# Patient Record
Sex: MALE | Race: WHITE | NOT HISPANIC OR LATINO | URBAN - METROPOLITAN AREA
[De-identification: names, ages, dates, MRNs, and addresses within clinical notes are randomized per-mention and may not be internally consistent; named-entity substitution may affect disease eponyms.]

---

## 2017-11-08 ENCOUNTER — APPOINTMENT (RX ONLY)
Dept: URBAN - METROPOLITAN AREA CLINIC 86 | Facility: CLINIC | Age: 72
Setting detail: DERMATOLOGY
End: 2017-11-08

## 2017-11-08 DIAGNOSIS — L57.0 ACTINIC KERATOSIS: ICD-10-CM

## 2017-11-08 DIAGNOSIS — L73.8 OTHER SPECIFIED FOLLICULAR DISORDERS: ICD-10-CM

## 2017-11-08 DIAGNOSIS — L82.1 OTHER SEBORRHEIC KERATOSIS: ICD-10-CM

## 2017-11-08 DIAGNOSIS — L91.8 OTHER HYPERTROPHIC DISORDERS OF THE SKIN: ICD-10-CM

## 2017-11-08 DIAGNOSIS — L57.8 OTHER SKIN CHANGES DUE TO CHRONIC EXPOSURE TO NONIONIZING RADIATION: ICD-10-CM

## 2017-11-08 PROCEDURE — 99213 OFFICE O/P EST LOW 20 MIN: CPT

## 2017-11-08 PROCEDURE — ? ADDITIONAL NOTES

## 2017-11-08 PROCEDURE — ? COUNSELING

## 2017-11-08 ASSESSMENT — LOCATION SIMPLE DESCRIPTION DERM
LOCATION SIMPLE: LEFT CHEEK
LOCATION SIMPLE: RIGHT MIDDLE FINGER
LOCATION SIMPLE: LEFT ANTERIOR NECK
LOCATION SIMPLE: RIGHT FOREHEAD
LOCATION SIMPLE: RIGHT CHEEK
LOCATION SIMPLE: RIGHT UPPER BACK
LOCATION SIMPLE: RIGHT HAND
LOCATION SIMPLE: LEFT EYEBROW

## 2017-11-08 ASSESSMENT — LOCATION DETAILED DESCRIPTION DERM
LOCATION DETAILED: RIGHT SUPERIOR FOREHEAD
LOCATION DETAILED: RIGHT SUPERIOR LATERAL MALAR CHEEK
LOCATION DETAILED: RIGHT SUPERIOR CENTRAL MALAR CHEEK
LOCATION DETAILED: RIGHT MIDDLE PROXIMAL INTERPHALANGEAL JOINT
LOCATION DETAILED: RIGHT SUPERIOR LATERAL UPPER BACK
LOCATION DETAILED: LEFT CLAVICULAR NECK
LOCATION DETAILED: LEFT LATERAL EYEBROW
LOCATION DETAILED: LEFT CENTRAL MALAR CHEEK
LOCATION DETAILED: LEFT SUPERIOR LATERAL BUCCAL CHEEK
LOCATION DETAILED: RIGHT ULNAR DORSAL HAND

## 2017-11-08 ASSESSMENT — LOCATION ZONE DERM
LOCATION ZONE: NECK
LOCATION ZONE: FINGER
LOCATION ZONE: HAND
LOCATION ZONE: FACE
LOCATION ZONE: TRUNK

## 2017-11-08 ASSESSMENT — TOTAL NUMBER OF LESIONS: # OF LESIONS?: 5

## 2017-11-08 NOTE — PROCEDURE: ADDITIONAL NOTES
Additional Notes: Dessication site around left eye, right eye, liquid nitrogen treated 2 sites on back

## 2018-10-18 ENCOUNTER — APPOINTMENT (RX ONLY)
Dept: URBAN - METROPOLITAN AREA CLINIC 86 | Facility: CLINIC | Age: 73
Setting detail: DERMATOLOGY
End: 2018-10-18

## 2018-10-18 DIAGNOSIS — H61.03 CHONDRITIS OF EXTERNAL EAR: ICD-10-CM

## 2018-10-18 DIAGNOSIS — D485 NEOPLASM OF UNCERTAIN BEHAVIOR OF SKIN: ICD-10-CM

## 2018-10-18 DIAGNOSIS — L57.8 OTHER SKIN CHANGES DUE TO CHRONIC EXPOSURE TO NONIONIZING RADIATION: ICD-10-CM | Status: STABLE

## 2018-10-18 DIAGNOSIS — L82.1 OTHER SEBORRHEIC KERATOSIS: ICD-10-CM

## 2018-10-18 DIAGNOSIS — L91.8 OTHER HYPERTROPHIC DISORDERS OF THE SKIN: ICD-10-CM

## 2018-10-18 PROBLEM — H61.032 CHONDRITIS OF LEFT EXTERNAL EAR: Status: ACTIVE | Noted: 2018-10-18

## 2018-10-18 PROBLEM — D48.5 NEOPLASM OF UNCERTAIN BEHAVIOR OF SKIN: Status: ACTIVE | Noted: 2018-10-18

## 2018-10-18 PROCEDURE — ? BENIGN DESTRUCTION COSMETIC

## 2018-10-18 PROCEDURE — 11100: CPT

## 2018-10-18 PROCEDURE — ? BIOPSY BY SHAVE METHOD

## 2018-10-18 PROCEDURE — ? COUNSELING

## 2018-10-18 PROCEDURE — 99213 OFFICE O/P EST LOW 20 MIN: CPT | Mod: 25

## 2018-10-18 ASSESSMENT — LOCATION SIMPLE DESCRIPTION DERM
LOCATION SIMPLE: RIGHT EYEBROW
LOCATION SIMPLE: LEFT EAR
LOCATION SIMPLE: LEFT EYEBROW
LOCATION SIMPLE: LEFT CHEEK
LOCATION SIMPLE: CHEST
LOCATION SIMPLE: RIGHT UPPER BACK
LOCATION SIMPLE: LEFT TEMPLE
LOCATION SIMPLE: LEFT UPPER ARM

## 2018-10-18 ASSESSMENT — LOCATION DETAILED DESCRIPTION DERM
LOCATION DETAILED: RIGHT SUPERIOR MEDIAL UPPER BACK
LOCATION DETAILED: RIGHT MEDIAL UPPER BACK
LOCATION DETAILED: LEFT CENTRAL TEMPLE
LOCATION DETAILED: STERNUM
LOCATION DETAILED: LEFT LATERAL EYEBROW
LOCATION DETAILED: LEFT CENTRAL MALAR CHEEK
LOCATION DETAILED: RIGHT LATERAL EYEBROW
LOCATION DETAILED: LEFT ANTIHELIX
LOCATION DETAILED: LEFT DISTAL POSTERIOR UPPER ARM

## 2018-10-18 ASSESSMENT — LOCATION ZONE DERM
LOCATION ZONE: FACE
LOCATION ZONE: EAR
LOCATION ZONE: ARM
LOCATION ZONE: TRUNK

## 2018-10-18 ASSESSMENT — PAIN INTENSITY VAS: HOW INTENSE IS YOUR PAIN 0 BEING NO PAIN, 10 BEING THE MOST SEVERE PAIN POSSIBLE?: NO PAIN

## 2018-10-18 NOTE — PROCEDURE: BENIGN DESTRUCTION COSMETIC
Detail Level: Simple
Anesthesia Volume In Cc: 0.5
Post-Care Instructions: I reviewed with the patient in detail post-care instructions. Patient is to wear sunprotection, and avoid picking at any of the treated lesions. Pt may apply Vaseline to crusted or scabbing areas.
Consent: The patient's consent was obtained including but not limited to risks of crusting, scabbing, blistering, scarring, darker or lighter pigmentary change, recurrence, incomplete removal and infection.

## 2018-10-18 NOTE — PROCEDURE: BIOPSY BY SHAVE METHOD
Depth Of Biopsy: dermis
Body Location Override (Optional - Billing Will Still Be Based On Selected Body Map Location If Applicable): left temple
Bill 92221 For Specimen Handling/Conveyance To Laboratory?: no
Consent: Verbal consent was obtained and risks were reviewed including but not limited to scarring, infection, bleeding, scabbing, incomplete removal, nerve damage and allergy to anesthesia.
Cryotherapy Text: The wound bed was treated with cryotherapy after the biopsy was performed.
Silver Nitrate Text: The wound bed was treated with silver nitrate after the biopsy was performed.
Anesthesia Volume In Cc (Will Not Render If 0): 0.5
Biopsy Method: 15 blade
Post-Care Instructions: I reviewed with the patient in detail post-care instructions. Patient is to keep the biopsy site dry overnight, and then apply bacitracin twice daily until healed. Patient may apply hydrogen peroxide soaks to remove any crusting.
Electrodesiccation Text: The wound bed was treated with electrodesiccation after the biopsy was performed.
Anesthesia Type: 2% lidocaine without epinephrine
Size Of Lesion In Cm: 0.6
Billing Type: Third-Party Bill
Additional Anesthesia Volume In Cc (Will Not Render If 0): 0
Curettage Text: The wound bed was treated with curettage after the biopsy was performed.
Biopsy Type: H and E
Render Post-Care Instructions In Note?: yes
Detail Level: Simple
Notification Instructions: Patient will be notified of biopsy results. However, patient instructed to call the office if not contacted within 2 weeks.
Lab: [id number]
Hemostasis: Drysol
Dressing: bandage
Lab Facility: [id number]
Type Of Destruction Used: Electrodesiccation
Electrodesiccation And Curettage Text: The wound bed was treated with electrodesiccation and curettage after the biopsy was performed.
Wound Care: Vaseline

## 2019-05-24 ENCOUNTER — APPOINTMENT (RX ONLY)
Dept: URBAN - METROPOLITAN AREA CLINIC 86 | Facility: CLINIC | Age: 74
Setting detail: DERMATOLOGY
End: 2019-05-24

## 2019-05-24 DIAGNOSIS — L57.8 OTHER SKIN CHANGES DUE TO CHRONIC EXPOSURE TO NONIONIZING RADIATION: ICD-10-CM | Status: STABLE

## 2019-05-24 DIAGNOSIS — D17 BENIGN LIPOMATOUS NEOPLASM: ICD-10-CM

## 2019-05-24 DIAGNOSIS — L82.1 OTHER SEBORRHEIC KERATOSIS: ICD-10-CM

## 2019-05-24 PROBLEM — D17.24 BENIGN LIPOMATOUS NEOPLASM OF SKIN AND SUBCUTANEOUS TISSUE OF LEFT LEG: Status: ACTIVE | Noted: 2019-05-24

## 2019-05-24 PROBLEM — D17.22 BENIGN LIPOMATOUS NEOPLASM OF SKIN AND SUBCUTANEOUS TISSUE OF LEFT ARM: Status: ACTIVE | Noted: 2019-05-24

## 2019-05-24 PROBLEM — D17.21 BENIGN LIPOMATOUS NEOPLASM OF SKIN AND SUBCUTANEOUS TISSUE OF RIGHT ARM: Status: ACTIVE | Noted: 2019-05-24

## 2019-05-24 PROCEDURE — ? LIQUID NITROGEN (COSMETIC)

## 2019-05-24 PROCEDURE — 99213 OFFICE O/P EST LOW 20 MIN: CPT

## 2019-05-24 PROCEDURE — ? COUNSELING

## 2019-05-24 ASSESSMENT — LOCATION DETAILED DESCRIPTION DERM
LOCATION DETAILED: LEFT ANTERIOR PROXIMAL THIGH
LOCATION DETAILED: RIGHT VENTRAL DISTAL FOREARM
LOCATION DETAILED: LEFT CLAVICULAR SKIN
LOCATION DETAILED: RIGHT MID TEMPLE
LOCATION DETAILED: LEFT VENTRAL PROXIMAL FOREARM

## 2019-05-24 ASSESSMENT — LOCATION ZONE DERM
LOCATION ZONE: LEG
LOCATION ZONE: FACE
LOCATION ZONE: ARM
LOCATION ZONE: TRUNK

## 2019-05-24 ASSESSMENT — PAIN INTENSITY VAS: HOW INTENSE IS YOUR PAIN 0 BEING NO PAIN, 10 BEING THE MOST SEVERE PAIN POSSIBLE?: NO PAIN

## 2019-05-24 ASSESSMENT — LOCATION SIMPLE DESCRIPTION DERM
LOCATION SIMPLE: LEFT CLAVICULAR SKIN
LOCATION SIMPLE: RIGHT FOREARM
LOCATION SIMPLE: RIGHT TEMPLE
LOCATION SIMPLE: LEFT THIGH
LOCATION SIMPLE: LEFT FOREARM

## 2019-12-30 ENCOUNTER — APPOINTMENT (RX ONLY)
Dept: URBAN - METROPOLITAN AREA CLINIC 86 | Facility: CLINIC | Age: 74
Setting detail: DERMATOLOGY
End: 2019-12-30

## 2019-12-30 DIAGNOSIS — L30.0 NUMMULAR DERMATITIS: ICD-10-CM | Status: STABLE

## 2019-12-30 PROCEDURE — ? TREATMENT REGIMEN

## 2019-12-30 PROCEDURE — ? PRESCRIPTION

## 2019-12-30 PROCEDURE — ? COUNSELING

## 2019-12-30 PROCEDURE — ? PRESCRIPTION SAMPLES PROVIDED

## 2019-12-30 PROCEDURE — 99213 OFFICE O/P EST LOW 20 MIN: CPT

## 2019-12-30 RX ORDER — TRIAMCINOLONE ACETONIDE 1 MG/G
BID CREAM TOPICAL BID
Qty: 1 | Refills: 1 | Status: ERX | COMMUNITY
Start: 2019-12-30

## 2019-12-30 RX ADMIN — TRIAMCINOLONE ACETONIDE BID: 1 CREAM TOPICAL at 00:00

## 2019-12-30 ASSESSMENT — LOCATION ZONE DERM: LOCATION ZONE: LEG

## 2019-12-30 ASSESSMENT — LOCATION DETAILED DESCRIPTION DERM: LOCATION DETAILED: RIGHT POSTERIOR ANKLE

## 2019-12-30 ASSESSMENT — LOCATION SIMPLE DESCRIPTION DERM: LOCATION SIMPLE: RIGHT ANKLE

## 2019-12-30 ASSESSMENT — BSA RASH: BSA RASH: 1

## 2019-12-30 ASSESSMENT — SEVERITY ASSESSMENT: SEVERITY: MILD TO MODERATE

## 2019-12-30 NOTE — PROCEDURE: MIPS QUALITY
Quality 111:Pneumonia Vaccination Status For Older Adults: Pneumococcal Vaccination Previously Received
Detail Level: Detailed
Quality 131: Pain Assessment And Follow-Up: No documentation of pain assessment, reason not given
Quality 130: Documentation Of Current Medications In The Medical Record: Current Medications Documented
Quality 402: Tobacco Use And Help With Quitting Among Adolescents: Patient screened for tobacco and is an ex-smoker
Quality 110: Preventive Care And Screening: Influenza Immunization: Influenza Immunization Administered during Influenza season

## 2020-07-31 ENCOUNTER — APPOINTMENT (RX ONLY)
Dept: URBAN - METROPOLITAN AREA CLINIC 86 | Facility: CLINIC | Age: 75
Setting detail: DERMATOLOGY
End: 2020-07-31

## 2020-07-31 VITALS — TEMPERATURE: 97.7 F

## 2020-07-31 DIAGNOSIS — L23.9 ALLERGIC CONTACT DERMATITIS, UNSPECIFIED CAUSE: ICD-10-CM

## 2020-07-31 DIAGNOSIS — L57.8 OTHER SKIN CHANGES DUE TO CHRONIC EXPOSURE TO NONIONIZING RADIATION: ICD-10-CM

## 2020-07-31 PROBLEM — L30.9 DERMATITIS, UNSPECIFIED: Status: ACTIVE | Noted: 2020-07-31

## 2020-07-31 PROCEDURE — ? TREATMENT REGIMEN

## 2020-07-31 PROCEDURE — ? COUNSELING

## 2020-07-31 PROCEDURE — 99213 OFFICE O/P EST LOW 20 MIN: CPT

## 2020-07-31 ASSESSMENT — LOCATION SIMPLE DESCRIPTION DERM: LOCATION SIMPLE: CHEST

## 2020-07-31 ASSESSMENT — BSA RASH: BSA RASH: 2

## 2020-07-31 ASSESSMENT — PAIN INTENSITY VAS: HOW INTENSE IS YOUR PAIN 0 BEING NO PAIN, 10 BEING THE MOST SEVERE PAIN POSSIBLE?: NO PAIN

## 2020-07-31 ASSESSMENT — LOCATION DETAILED DESCRIPTION DERM: LOCATION DETAILED: STERNUM

## 2020-07-31 ASSESSMENT — LOCATION ZONE DERM: LOCATION ZONE: TRUNK

## 2020-07-31 ASSESSMENT — SEVERITY ASSESSMENT 2020: SEVERITY 2020: MILD

## 2021-01-20 ENCOUNTER — APPOINTMENT (RX ONLY)
Dept: URBAN - METROPOLITAN AREA CLINIC 86 | Facility: CLINIC | Age: 76
Setting detail: DERMATOLOGY
End: 2021-01-20

## 2021-01-20 VITALS — TEMPERATURE: 97.1 F | HEIGHT: 73 IN | WEIGHT: 200 LBS

## 2021-01-20 DIAGNOSIS — L57.8 OTHER SKIN CHANGES DUE TO CHRONIC EXPOSURE TO NONIONIZING RADIATION: ICD-10-CM

## 2021-01-20 DIAGNOSIS — L20.89 OTHER ATOPIC DERMATITIS: ICD-10-CM | Status: INADEQUATELY CONTROLLED

## 2021-01-20 PROBLEM — L20.84 INTRINSIC (ALLERGIC) ECZEMA: Status: ACTIVE | Noted: 2021-01-20

## 2021-01-20 PROCEDURE — ? PRESCRIPTION

## 2021-01-20 PROCEDURE — ? COUNSELING

## 2021-01-20 PROCEDURE — 99213 OFFICE O/P EST LOW 20 MIN: CPT

## 2021-01-20 PROCEDURE — ? PRESCRIPTION MEDICATION MANAGEMENT

## 2021-01-20 RX ORDER — TRIAMCINOLONE ACETONIDE OINTMENT USP, 0.05% 0.5 MG/G
OINTMENT TOPICAL
Qty: 1 | Refills: 1 | Status: ERX | COMMUNITY
Start: 2021-01-20

## 2021-01-20 RX ADMIN — TRIAMCINOLONE ACETONIDE OINTMENT USP, 0.05%: 0.5 OINTMENT TOPICAL at 00:00

## 2021-01-20 ASSESSMENT — LOCATION ZONE DERM
LOCATION ZONE: LEG
LOCATION ZONE: ARM
LOCATION ZONE: TRUNK
LOCATION ZONE: FACE

## 2021-01-20 ASSESSMENT — LOCATION DETAILED DESCRIPTION DERM
LOCATION DETAILED: SUPERIOR THORACIC SPINE
LOCATION DETAILED: LEFT INFERIOR CENTRAL MALAR CHEEK
LOCATION DETAILED: LEFT MEDIAL SUPERIOR CHEST
LOCATION DETAILED: LEFT PROXIMAL DORSAL FOREARM
LOCATION DETAILED: RIGHT DISTAL DORSAL FOREARM
LOCATION DETAILED: LEFT ANTERIOR PROXIMAL THIGH
LOCATION DETAILED: RIGHT PROXIMAL PRETIBIAL REGION

## 2021-01-20 ASSESSMENT — LOCATION SIMPLE DESCRIPTION DERM
LOCATION SIMPLE: CHEST
LOCATION SIMPLE: RIGHT PRETIBIAL REGION
LOCATION SIMPLE: UPPER BACK
LOCATION SIMPLE: RIGHT FOREARM
LOCATION SIMPLE: LEFT FOREARM
LOCATION SIMPLE: LEFT CHEEK
LOCATION SIMPLE: LEFT THIGH

## 2021-01-20 ASSESSMENT — SEVERITY ASSESSMENT 2020: SEVERITY 2020: MODERATE

## 2021-01-20 NOTE — PROCEDURE: PRESCRIPTION MEDICATION MANAGEMENT
Plan: Cooler baths or showers \\nHumidifier suggested at night \\nGentle cleansers
Detail Level: Zone
Render In Strict Bullet Format?: No
Initiate Treatment: TACO oint bid x 2 weeks or less prn
Samples Given: Eucerin with ceramides daily

## 2021-01-25 ENCOUNTER — RX ONLY (OUTPATIENT)
Age: 76
Setting detail: RX ONLY
End: 2021-01-25

## 2021-01-25 RX ORDER — CLOBETASOL PROPIONATE 0.5 MG/G
ONE APPLICATION OINTMENT TOPICAL
Qty: 1 | Refills: 1 | Status: ERX | COMMUNITY
Start: 2021-01-25

## 2021-01-25 RX ORDER — TRIAMCINOLONE ACETONIDE OINTMENT USP, 0.05% 0.5 MG/G
OINTMENT TOPICAL
Qty: 1 | Refills: 1 | Status: CANCELLED
Stop reason: CLARIF

## 2021-07-16 ENCOUNTER — APPOINTMENT (RX ONLY)
Dept: URBAN - METROPOLITAN AREA CLINIC 86 | Facility: CLINIC | Age: 76
Setting detail: DERMATOLOGY
End: 2021-07-16

## 2021-07-16 VITALS — HEIGHT: 73 IN | WEIGHT: 200 LBS

## 2021-07-16 DIAGNOSIS — L57.0 ACTINIC KERATOSIS: ICD-10-CM

## 2021-07-16 DIAGNOSIS — L57.8 OTHER SKIN CHANGES DUE TO CHRONIC EXPOSURE TO NONIONIZING RADIATION: ICD-10-CM

## 2021-07-16 PROCEDURE — 17000 DESTRUCT PREMALG LESION: CPT

## 2021-07-16 PROCEDURE — 17003 DESTRUCT PREMALG LES 2-14: CPT

## 2021-07-16 PROCEDURE — ? DIAGNOSIS COMMENT

## 2021-07-16 PROCEDURE — 99212 OFFICE O/P EST SF 10 MIN: CPT | Mod: 25

## 2021-07-16 PROCEDURE — ? LIQUID NITROGEN

## 2021-07-16 PROCEDURE — ? COUNSELING

## 2021-07-16 ASSESSMENT — LOCATION SIMPLE DESCRIPTION DERM
LOCATION SIMPLE: INFERIOR FOREHEAD
LOCATION SIMPLE: SCALP
LOCATION SIMPLE: NOSE

## 2021-07-16 ASSESSMENT — LOCATION ZONE DERM
LOCATION ZONE: FACE
LOCATION ZONE: NOSE
LOCATION ZONE: SCALP

## 2021-07-16 ASSESSMENT — LOCATION DETAILED DESCRIPTION DERM
LOCATION DETAILED: NASAL DORSUM
LOCATION DETAILED: INFERIOR MID FOREHEAD
LOCATION DETAILED: LEFT SUPERIOR PARIETAL SCALP

## 2021-07-16 NOTE — PROCEDURE: LIQUID NITROGEN
Consent: The patient's consent was obtained including but not limited to risks of crusting, scabbing, blistering, scarring, darker or lighter pigmentary change, recurrence, incomplete removal and infection.
Number Of Freeze-Thaw Cycles: 1 freeze-thaw cycle
Post-Care Instructions: I reviewed with the patient in detail post-care instructions. Patient is to wear sunprotection, and avoid picking at any of the treated lesions. Pt may apply Vaseline to crusted or scabbing areas.
Detail Level: Detailed
Show Applicator Variable?: Yes
Render Post-Care Instructions In Note?: no
Duration Of Freeze Thaw-Cycle (Seconds): 0

## 2021-07-16 NOTE — PROCEDURE: DIAGNOSIS COMMENT
Detail Level: Detailed
Render Risk Assessment In Note?: no
Comment: We discussed field treatments and PDT, he preferred cryosurgery today.